# Patient Record
Sex: FEMALE | Race: BLACK OR AFRICAN AMERICAN | Employment: OTHER | URBAN - METROPOLITAN AREA
[De-identification: names, ages, dates, MRNs, and addresses within clinical notes are randomized per-mention and may not be internally consistent; named-entity substitution may affect disease eponyms.]

---

## 2017-01-20 ENCOUNTER — OP HISTORICAL/CONVERTED ENCOUNTER (OUTPATIENT)
Dept: OTHER | Age: 77
End: 2017-01-20

## 2017-07-13 ENCOUNTER — OP HISTORICAL/CONVERTED ENCOUNTER (OUTPATIENT)
Dept: OTHER | Age: 77
End: 2017-07-13

## 2018-05-27 ENCOUNTER — ED HISTORICAL/CONVERTED ENCOUNTER (OUTPATIENT)
Dept: OTHER | Age: 78
End: 2018-05-27

## 2018-08-16 ENCOUNTER — OP HISTORICAL/CONVERTED ENCOUNTER (OUTPATIENT)
Dept: OTHER | Age: 78
End: 2018-08-16

## 2019-04-04 ENCOUNTER — ED HISTORICAL/CONVERTED ENCOUNTER (OUTPATIENT)
Dept: OTHER | Age: 79
End: 2019-04-04

## 2019-09-17 ENCOUNTER — OP HISTORICAL/CONVERTED ENCOUNTER (OUTPATIENT)
Dept: OTHER | Age: 79
End: 2019-09-17

## 2021-10-28 ENCOUNTER — TRANSCRIBE ORDER (OUTPATIENT)
Dept: SCHEDULING | Age: 81
End: 2021-10-28

## 2021-10-28 DIAGNOSIS — Z12.31 ENCOUNTER FOR MAMMOGRAM TO ESTABLISH BASELINE MAMMOGRAM: Primary | ICD-10-CM

## 2021-12-09 ENCOUNTER — TRANSCRIBE ORDER (OUTPATIENT)
Dept: SCHEDULING | Age: 81
End: 2021-12-09

## 2021-12-09 DIAGNOSIS — Z12.31 SCREENING MAMMOGRAM FOR HIGH-RISK PATIENT: Primary | ICD-10-CM

## 2021-12-17 ENCOUNTER — HOSPITAL ENCOUNTER (OUTPATIENT)
Dept: MAMMOGRAPHY | Age: 81
Discharge: HOME | End: 2021-12-17
Payer: MEDICARE

## 2021-12-17 DIAGNOSIS — Z12.31 SCREENING MAMMOGRAM FOR HIGH-RISK PATIENT: ICD-10-CM

## 2021-12-17 PROCEDURE — 77063 BREAST TOMOSYNTHESIS BI: CPT

## 2022-12-29 ENCOUNTER — TRANSCRIBE ORDER (OUTPATIENT)
Dept: SCHEDULING | Age: 82
End: 2022-12-29

## 2022-12-29 DIAGNOSIS — Z12.31 SCREENING MAMMOGRAM FOR HIGH-RISK PATIENT: Primary | ICD-10-CM

## 2022-12-30 ENCOUNTER — HOSPITAL ENCOUNTER (OUTPATIENT)
Dept: MAMMOGRAPHY | Age: 82
Discharge: HOME | End: 2022-12-30
Payer: MEDICARE

## 2022-12-30 DIAGNOSIS — Z12.31 SCREENING MAMMOGRAM FOR HIGH-RISK PATIENT: ICD-10-CM

## 2022-12-30 PROCEDURE — 77063 BREAST TOMOSYNTHESIS BI: CPT

## 2023-04-07 ENCOUNTER — HOSPITAL ENCOUNTER (OUTPATIENT)
Age: 83
End: 2023-04-07
Attending: STUDENT IN AN ORGANIZED HEALTH CARE EDUCATION/TRAINING PROGRAM
Payer: MEDICARE

## 2023-07-03 NOTE — PRE-PROCEDURE INSTRUCTIONS
Called patient at [phone]. No answer. Voicemail left with detailed pre-procedure instructions. Made aware of Importance of having a responsible party bring and take her home. Arrival time of 0845 given. Endoscopy number provided.

## 2023-07-07 ENCOUNTER — ANESTHESIA (OUTPATIENT)
Facility: HOSPITAL | Age: 83
End: 2023-07-07
Payer: MEDICARE

## 2023-07-07 ENCOUNTER — HOSPITAL ENCOUNTER (OUTPATIENT)
Facility: HOSPITAL | Age: 83
Setting detail: OUTPATIENT SURGERY
Discharge: HOME | End: 2023-07-07
Attending: INTERNAL MEDICINE | Admitting: INTERNAL MEDICINE
Payer: MEDICARE

## 2023-07-07 ENCOUNTER — ANESTHESIA EVENT (OUTPATIENT)
Facility: HOSPITAL | Age: 83
End: 2023-07-07
Payer: MEDICARE

## 2023-07-07 VITALS
DIASTOLIC BLOOD PRESSURE: 64 MMHG | TEMPERATURE: 97.9 F | HEIGHT: 56 IN | WEIGHT: 118 LBS | SYSTOLIC BLOOD PRESSURE: 132 MMHG | OXYGEN SATURATION: 98 % | HEART RATE: 58 BPM | BODY MASS INDEX: 26.54 KG/M2 | RESPIRATION RATE: 16 BRPM

## 2023-07-07 LAB
GLUCOSE BLD STRIP.AUTO-MCNC: 208 MG/DL (ref 65–100)
PERFORMED BY:: ABNORMAL

## 2023-07-07 PROCEDURE — 3700000000 HC ANESTHESIA ATTENDED CARE: Performed by: INTERNAL MEDICINE

## 2023-07-07 PROCEDURE — 7100000010 HC PHASE II RECOVERY - FIRST 15 MIN: Performed by: INTERNAL MEDICINE

## 2023-07-07 PROCEDURE — 2580000003 HC RX 258: Performed by: NURSE ANESTHETIST, CERTIFIED REGISTERED

## 2023-07-07 PROCEDURE — 3700000001 HC ADD 15 MINUTES (ANESTHESIA): Performed by: INTERNAL MEDICINE

## 2023-07-07 PROCEDURE — 6360000002 HC RX W HCPCS: Performed by: NURSE ANESTHETIST, CERTIFIED REGISTERED

## 2023-07-07 PROCEDURE — 3600007512: Performed by: INTERNAL MEDICINE

## 2023-07-07 PROCEDURE — 2709999900 HC NON-CHARGEABLE SUPPLY: Performed by: INTERNAL MEDICINE

## 2023-07-07 PROCEDURE — 7100000011 HC PHASE II RECOVERY - ADDTL 15 MIN: Performed by: INTERNAL MEDICINE

## 2023-07-07 PROCEDURE — 82962 GLUCOSE BLOOD TEST: CPT

## 2023-07-07 PROCEDURE — 3600007502: Performed by: INTERNAL MEDICINE

## 2023-07-07 PROCEDURE — 2500000003 HC RX 250 WO HCPCS: Performed by: NURSE ANESTHETIST, CERTIFIED REGISTERED

## 2023-07-07 RX ORDER — SODIUM CHLORIDE, SODIUM LACTATE, POTASSIUM CHLORIDE, CALCIUM CHLORIDE 600; 310; 30; 20 MG/100ML; MG/100ML; MG/100ML; MG/100ML
INJECTION, SOLUTION INTRAVENOUS CONTINUOUS PRN
Status: DISCONTINUED | OUTPATIENT
Start: 2023-07-07 | End: 2023-07-07 | Stop reason: SDUPTHER

## 2023-07-07 RX ORDER — HYDROCHLOROTHIAZIDE 12.5 MG/1
TABLET ORAL
COMMUNITY
Start: 2023-05-08

## 2023-07-07 RX ORDER — LIDOCAINE HYDROCHLORIDE 20 MG/ML
INJECTION, SOLUTION EPIDURAL; INFILTRATION; INTRACAUDAL; PERINEURAL PRN
Status: DISCONTINUED | OUTPATIENT
Start: 2023-07-07 | End: 2023-07-07 | Stop reason: SDUPTHER

## 2023-07-07 RX ORDER — POTASSIUM CHLORIDE 750 MG/1
1 TABLET, EXTENDED RELEASE ORAL DAILY
COMMUNITY
Start: 2022-08-10

## 2023-07-07 RX ORDER — SODIUM CHLORIDE, SODIUM LACTATE, POTASSIUM CHLORIDE, CALCIUM CHLORIDE 600; 310; 30; 20 MG/100ML; MG/100ML; MG/100ML; MG/100ML
INJECTION, SOLUTION INTRAVENOUS CONTINUOUS
Status: DISCONTINUED | OUTPATIENT
Start: 2023-07-07 | End: 2023-07-07 | Stop reason: HOSPADM

## 2023-07-07 RX ORDER — ESCITALOPRAM OXALATE 5 MG/1
TABLET ORAL
COMMUNITY
Start: 2023-05-08

## 2023-07-07 RX ORDER — VERAPAMIL HYDROCHLORIDE 120 MG/1
TABLET, FILM COATED ORAL
COMMUNITY
Start: 2023-05-08

## 2023-07-07 RX ORDER — SODIUM CHLORIDE 9 MG/ML
INJECTION, SOLUTION INTRAVENOUS CONTINUOUS
Status: DISCONTINUED | OUTPATIENT
Start: 2023-07-07 | End: 2023-07-07 | Stop reason: HOSPADM

## 2023-07-07 RX ORDER — ALENDRONATE SODIUM 70 MG/1
TABLET ORAL
COMMUNITY
Start: 2023-05-08

## 2023-07-07 RX ORDER — PRAVASTATIN SODIUM 20 MG
TABLET ORAL
COMMUNITY
Start: 2023-05-08

## 2023-07-07 RX ORDER — PREDNISONE 10 MG/1
TABLET ORAL
COMMUNITY
Start: 2023-06-15

## 2023-07-07 RX ORDER — CLONAZEPAM 0.5 MG/1
TABLET ORAL
COMMUNITY
Start: 2023-05-08

## 2023-07-07 RX ORDER — OMEPRAZOLE 40 MG/1
CAPSULE, DELAYED RELEASE ORAL
COMMUNITY
Start: 2023-05-08

## 2023-07-07 RX ORDER — FOLIC ACID 1 MG/1
1000 TABLET ORAL DAILY
COMMUNITY
Start: 2023-06-15

## 2023-07-07 RX ORDER — PROPOFOL 10 MG/ML
INJECTION, EMULSION INTRAVENOUS PRN
Status: DISCONTINUED | OUTPATIENT
Start: 2023-07-07 | End: 2023-07-07 | Stop reason: SDUPTHER

## 2023-07-07 RX ADMIN — SODIUM CHLORIDE, POTASSIUM CHLORIDE, SODIUM LACTATE AND CALCIUM CHLORIDE: 600; 310; 30; 20 INJECTION, SOLUTION INTRAVENOUS at 11:12

## 2023-07-07 RX ADMIN — PROPOFOL 30 MG: 10 INJECTION, EMULSION INTRAVENOUS at 11:20

## 2023-07-07 RX ADMIN — PROPOFOL 20 MG: 10 INJECTION, EMULSION INTRAVENOUS at 11:25

## 2023-07-07 RX ADMIN — LIDOCAINE HYDROCHLORIDE 80 MG: 20 INJECTION, SOLUTION EPIDURAL; INFILTRATION; INTRACAUDAL; PERINEURAL at 11:14

## 2023-07-07 RX ADMIN — PROPOFOL 80 MG: 10 INJECTION, EMULSION INTRAVENOUS at 11:14

## 2023-07-07 ASSESSMENT — PAIN SCALES - GENERAL
PAINLEVEL_OUTOF10: 0
PAINLEVEL_OUTOF10: 0

## 2023-07-07 ASSESSMENT — PAIN - FUNCTIONAL ASSESSMENT: PAIN_FUNCTIONAL_ASSESSMENT: NONE - DENIES PAIN

## 2023-07-07 NOTE — ANESTHESIA POSTPROCEDURE EVALUATION
Department of Anesthesiology  Postprocedure Note    Patient: Linette Stanton  MRN: [id number]  YOB: 1940  Date of evaluation: 7/7/2023      Procedure Summary     Date: 07/07/23 Room / Location: [facility] 01 / [facility] ENDOSCOPY    Anesthesia Start: 1112 Anesthesia Stop: 6899    Procedure: COLONOSCOPY Diagnosis:       Change in bowel habits      Hx of colonic polyps      (Change in bowel habits [R19.4])      (Hx of colonic polyps [Z86.010])    Surgeons: Billie Yang MD Responsible Provider: Rebecca Dorado MD    Anesthesia Type: MAC, TIVA ASA Status: 3          Anesthesia Type: No value filed. Claudia Phase I: Claudia Score: 10    Claudia Phase II:        Anesthesia Post Evaluation    Patient location during evaluation: bedside (Endoscopy Unit)  Patient participation: complete - patient participated  Level of consciousness: sleepy but conscious  Pain score: 0  Airway patency: patent  Nausea & Vomiting: no nausea and no vomiting  Complications: no  Cardiovascular status: hemodynamically stable  Respiratory status: acceptable  Hydration status: stable  Comments: This patient remained on the stretcher. The patient was handed off to the endoscopy nursing team.  All questions regarding pre-, intra-, and postoperative care were answered.   Multimodal analgesia pain management approach PAST SURGICAL HISTORY:  ACL (anterior cruciate ligament) tear

## 2023-07-07 NOTE — DISCHARGE INSTRUCTIONS
Return to normal activities tomorrow. -  High fiber diet. -  Continue present medications. -  Return to GI clinic in 3 months.

## 2023-07-07 NOTE — PERIOP NOTE
Patient alert and oriented x 4 with respirations even and unlabored on RA. Patient discharged home per physician's order. Patient and patient's daughter verbalize understanding of patient's discharge instructions. Patient declines wheelchair transport for discharge. Patient ambulating with steady gait noted accompanied by nurse to front hospital entrance with patient's daughter present to transport patient via private vehicle.

## 2023-11-24 ENCOUNTER — HOSPITAL ENCOUNTER (EMERGENCY)
Facility: HOSPITAL | Age: 83
Discharge: HOME | End: 2023-11-24
Attending: EMERGENCY MEDICINE
Payer: MEDICARE

## 2023-11-24 VITALS
RESPIRATION RATE: 16 BRPM | OXYGEN SATURATION: 99 % | TEMPERATURE: 98.8 F | HEIGHT: 56 IN | DIASTOLIC BLOOD PRESSURE: 63 MMHG | HEART RATE: 75 BPM | SYSTOLIC BLOOD PRESSURE: 137 MMHG | WEIGHT: 117 LBS | BODY MASS INDEX: 26.32 KG/M2

## 2023-11-24 DIAGNOSIS — K04.7 DENTAL ABSCESS: Primary | ICD-10-CM

## 2023-11-24 PROCEDURE — 6370000000 HC RX 637 (ALT 250 FOR IP): Performed by: EMERGENCY MEDICINE

## 2023-11-24 PROCEDURE — 99283 EMERGENCY DEPT VISIT LOW MDM: CPT

## 2023-11-24 RX ORDER — CLINDAMYCIN HYDROCHLORIDE 300 MG/1
300 CAPSULE ORAL 4 TIMES DAILY
Qty: 28 CAPSULE | Refills: 0 | Status: SHIPPED | OUTPATIENT
Start: 2023-11-24 | End: 2023-12-01

## 2023-11-24 RX ORDER — DIPHENHYDRAMINE HCL 12.5MG/5ML
12.5 LIQUID (ML) ORAL
Status: COMPLETED | OUTPATIENT
Start: 2023-11-24 | End: 2023-11-24

## 2023-11-24 RX ORDER — LIDOCAINE HYDROCHLORIDE 20 MG/ML
15 SOLUTION OROPHARYNGEAL
Status: COMPLETED | OUTPATIENT
Start: 2023-11-24 | End: 2023-11-24

## 2023-11-24 RX ADMIN — Medication 15 ML: at 10:13

## 2023-11-24 RX ADMIN — BENZOCAINE, BUTAMBEN, AND TETRACAINE HYDROCHLORIDE 1 SPRAY: .028; .004; .004 AEROSOL, SPRAY TOPICAL at 10:13

## 2023-11-24 RX ADMIN — DIPHENHYDRAMINE HYDROCHLORIDE 12.5 MG: 25 SOLUTION ORAL at 10:13

## 2023-11-24 ASSESSMENT — PAIN - FUNCTIONAL ASSESSMENT: PAIN_FUNCTIONAL_ASSESSMENT: 0-10

## 2023-11-24 ASSESSMENT — PAIN DESCRIPTION - LOCATION: LOCATION: MOUTH

## 2023-11-24 ASSESSMENT — PAIN SCALES - GENERAL: PAINLEVEL_OUTOF10: 8

## 2023-11-24 ASSESSMENT — PAIN DESCRIPTION - ORIENTATION: ORIENTATION: LOWER;RIGHT

## 2023-11-24 NOTE — ED PROVIDER NOTES
[facility] EMERGENCY DEPT  EMERGENCY DEPARTMENT HISTORY AND PHYSICAL EXAM      Date: 11/24/2023  Patient Name: Rhea Blankenship  MRN: [id number]  [address] [facility] 1940  Date of evaluation: 11/24/2023  Provider: Juan Diego Fernandez MD   Note Started: 10:13 AM EST 11/24/23    HISTORY OF PRESENT ILLNESS     Chief Complaint   Patient presents with    Abscess       History Provided By: Patient    HPI: Rhea Blankenship is a 82 y.o. female presents to the emergency department complaint of right lower tooth infection x 3 weeks. Patient reports having seen primary care and was put on amoxicillin that helps with the pain but is concerned the abscess is still there. Patient states she has a follow-up on Tuesday with her dentist to have the teeth removed. Patient denies fevers or chills, denies difficulty eating or drinking. PAST MEDICAL HISTORY   Past Medical History:  Past Medical History:   Diagnosis Date    Arthritis     Depression     Diabetes mellitus ([address])     Hyperlipidemia     Hypertension     Migraine        Past Surgical History:  Past Surgical History:   Procedure Laterality Date    COLONOSCOPY      polyps    COLONOSCOPY N/A 7/7/2023    COLONOSCOPY performed by Romi Cardenas MD at [facility] ENDOSCOPY       Family History:  No family history on file.     Social History:  Social History     Tobacco Use    Smoking status: Never   Substance Use Topics    Alcohol use: Never    Drug use: Never       Allergies:  No Known Allergies    PCP: Leonarda Burrell MD    Current Meds:   Current Facility-Administered Medications   Medication Dose Route Frequency Provider Last Rate Last Admin    diphenhydrAMINE (BENADRYL) 12.5 MG/5ML elixir 12.5 mg  12.5 mg Oral NOW Juan Diego Fernandez MD        lidocaine viscous hcl (XYLOCAINE) 2 % solution 15 mL  15 mL Mouth/Throat NOW Juan Diego Fernandez MD        butamben-tetracaine-benzocaine (CETACAINE) spray 1 spray  1 spray Topical NOW Juan Diego Fernandez MD         Current Outpatient

## 2023-11-24 NOTE — ED TRIAGE NOTES
Patient reports abscess on gum on right lower side x3 weeks. Was seen by PCP & was given amoxicillin that helped with the pain but the abscess is still there.

## 2024-01-04 ENCOUNTER — HOSPITAL ENCOUNTER (OUTPATIENT)
Facility: HOSPITAL | Age: 84
Discharge: HOME | End: 2024-01-04
Payer: MEDICARE

## 2024-01-04 VITALS — WEIGHT: 117 LBS | BODY MASS INDEX: 26.32 KG/M2 | HEIGHT: 56 IN

## 2024-01-04 DIAGNOSIS — Z12.31 ENCOUNTER FOR SCREENING MAMMOGRAM FOR MALIGNANT NEOPLASM OF BREAST: ICD-10-CM

## 2024-01-04 PROCEDURE — 77063 BREAST TOMOSYNTHESIS BI: CPT

## 2024-04-24 ENCOUNTER — HOSPITAL ENCOUNTER (EMERGENCY)
Facility: HOSPITAL | Age: 84
Discharge: HOME | End: 2024-04-24
Attending: STUDENT IN AN ORGANIZED HEALTH CARE EDUCATION/TRAINING PROGRAM
Payer: MEDICARE

## 2024-04-24 VITALS
HEIGHT: 55 IN | SYSTOLIC BLOOD PRESSURE: 152 MMHG | HEART RATE: 79 BPM | WEIGHT: 109 LBS | OXYGEN SATURATION: 99 % | DIASTOLIC BLOOD PRESSURE: 73 MMHG | TEMPERATURE: 98.3 F | BODY MASS INDEX: 25.22 KG/M2 | RESPIRATION RATE: 16 BRPM

## 2024-04-24 DIAGNOSIS — N39.0 URINARY TRACT INFECTION IN FEMALE: Primary | ICD-10-CM

## 2024-04-24 LAB
ALBUMIN SERPL-MCNC: 4.2 G/DL (ref 3.5–5)
ALBUMIN/GLOB SERPL: 1.9 (ref 1.1–2.2)
ALP SERPL-CCNC: 36 U/L (ref 45–117)
ALT SERPL-CCNC: 20 U/L (ref 12–78)
ANION GAP SERPL CALC-SCNC: 11 MMOL/L (ref 5–15)
APPEARANCE UR: ABNORMAL
AST SERPL W P-5'-P-CCNC: 9 U/L (ref 15–37)
BACTERIA URNS QL MICRO: ABNORMAL /HPF
BASOPHILS # BLD: 0 K/UL (ref 0–0.1)
BASOPHILS NFR BLD: 0 % (ref 0–1)
BILIRUB SERPL-MCNC: 4.2 MG/DL (ref 0.2–1)
BILIRUB UR QL CFM: NEGATIVE
BILIRUB UR QL: ABNORMAL
BUN SERPL-MCNC: 13 MG/DL (ref 6–20)
BUN/CREAT SERPL: 17 (ref 12–20)
CA-I BLD-MCNC: 9.3 MG/DL (ref 8.5–10.1)
CHLORIDE SERPL-SCNC: 97 MMOL/L (ref 97–108)
CO2 SERPL-SCNC: 29 MMOL/L (ref 21–32)
COLOR UR: ABNORMAL
CREAT SERPL-MCNC: 0.76 MG/DL (ref 0.55–1.02)
DIFFERENTIAL METHOD BLD: ABNORMAL
EOSINOPHIL # BLD: 0.4 K/UL (ref 0–0.4)
EOSINOPHIL NFR BLD: 3 % (ref 0–7)
EPITH CASTS URNS QL MICRO: ABNORMAL /LPF
ERYTHROCYTE [DISTWIDTH] IN BLOOD BY AUTOMATED COUNT: 31.1 % (ref 11.5–14.5)
GLOBULIN SER CALC-MCNC: 2.2 G/DL (ref 2–4)
GLUCOSE SERPL-MCNC: 172 MG/DL (ref 65–100)
GLUCOSE UR STRIP.AUTO-MCNC: NEGATIVE MG/DL
HCT VFR BLD AUTO: 24.6 % (ref 35–47)
HGB BLD-MCNC: 9 G/DL (ref 11.5–16)
HGB UR QL STRIP: ABNORMAL
IMM GRANULOCYTES # BLD AUTO: 0 K/UL
IMM GRANULOCYTES NFR BLD AUTO: 0 %
KETONES UR QL STRIP.AUTO: NEGATIVE MG/DL
LEUKOCYTE ESTERASE UR QL STRIP.AUTO: ABNORMAL
LYMPHOCYTES # BLD: 4.1 K/UL (ref 0.8–3.5)
LYMPHOCYTES NFR BLD: 35 % (ref 12–49)
MCH RBC QN AUTO: 36.3 PG (ref 26–34)
MCHC RBC AUTO-ENTMCNC: 36.6 G/DL (ref 30–36.5)
MCV RBC AUTO: 99.2 FL (ref 80–99)
METAMYELOCYTES NFR BLD MANUAL: 2 %
MONOCYTES # BLD: 0.5 K/UL (ref 0–1)
MONOCYTES NFR BLD: 4 % (ref 5–13)
MYELOCYTES NFR BLD MANUAL: 1 %
NEUTS BAND NFR BLD MANUAL: 9 % (ref 0–6)
NEUTS SEG # BLD: 6.4 K/UL (ref 1.8–8)
NEUTS SEG NFR BLD: 46 % (ref 32–75)
NITRITE UR QL STRIP.AUTO: POSITIVE
NRBC # BLD: 0.47 K/UL
NRBC BLD MANUAL-RTO: 4 PER 100 WBC
PH UR STRIP: 5 (ref 5–8)
PLATELET # BLD AUTO: 359 K/UL (ref 150–400)
PMV BLD AUTO: 11.7 FL (ref 8.9–12.9)
POTASSIUM SERPL-SCNC: 3.7 MMOL/L (ref 3.5–5.1)
PROT SERPL-MCNC: 6.4 G/DL (ref 6.4–8.2)
PROT UR STRIP-MCNC: >300 MG/DL
RBC # BLD AUTO: 2.48 M/UL (ref 3.8–5.2)
RBC #/AREA URNS HPF: >100 /HPF (ref 0–5)
RBC MORPH BLD: ABNORMAL
SODIUM SERPL-SCNC: 137 MMOL/L (ref 136–145)
SP GR UR REFRACTOMETRY: 1.01 (ref 1–1.03)
URINE CULTURE IF INDICATED: ABNORMAL
UROBILINOGEN UR QL STRIP.AUTO: 8 EU/DL (ref 0.2–1)
WBC # BLD AUTO: 11.6 K/UL (ref 3.6–11)
WBC URNS QL MICRO: ABNORMAL /HPF (ref 0–4)

## 2024-04-24 PROCEDURE — 96374 THER/PROPH/DIAG INJ IV PUSH: CPT

## 2024-04-24 PROCEDURE — 80053 COMPREHEN METABOLIC PANEL: CPT

## 2024-04-24 PROCEDURE — 2580000003 HC RX 258: Performed by: STUDENT IN AN ORGANIZED HEALTH CARE EDUCATION/TRAINING PROGRAM

## 2024-04-24 PROCEDURE — 6360000002 HC RX W HCPCS: Performed by: STUDENT IN AN ORGANIZED HEALTH CARE EDUCATION/TRAINING PROGRAM

## 2024-04-24 PROCEDURE — 81001 URINALYSIS AUTO W/SCOPE: CPT

## 2024-04-24 PROCEDURE — 87088 URINE BACTERIA CULTURE: CPT

## 2024-04-24 PROCEDURE — 99284 EMERGENCY DEPT VISIT MOD MDM: CPT

## 2024-04-24 PROCEDURE — 85025 COMPLETE CBC W/AUTO DIFF WBC: CPT

## 2024-04-24 PROCEDURE — 87086 URINE CULTURE/COLONY COUNT: CPT

## 2024-04-24 PROCEDURE — 87186 SC STD MICRODIL/AGAR DIL: CPT

## 2024-04-24 PROCEDURE — 87147 CULTURE TYPE IMMUNOLOGIC: CPT

## 2024-04-24 PROCEDURE — 6370000000 HC RX 637 (ALT 250 FOR IP): Performed by: STUDENT IN AN ORGANIZED HEALTH CARE EDUCATION/TRAINING PROGRAM

## 2024-04-24 RX ORDER — ACETAMINOPHEN 500 MG
1000 TABLET ORAL
Status: COMPLETED | OUTPATIENT
Start: 2024-04-24 | End: 2024-04-24

## 2024-04-24 RX ORDER — 0.9 % SODIUM CHLORIDE 0.9 %
1000 INTRAVENOUS SOLUTION INTRAVENOUS ONCE
Status: COMPLETED | OUTPATIENT
Start: 2024-04-24 | End: 2024-04-24

## 2024-04-24 RX ORDER — CEPHALEXIN 500 MG/1
500 CAPSULE ORAL 2 TIMES DAILY
Qty: 14 CAPSULE | Refills: 0 | Status: SHIPPED | OUTPATIENT
Start: 2024-04-24 | End: 2024-05-01

## 2024-04-24 RX ADMIN — CEFTRIAXONE SODIUM 1000 MG: 1 INJECTION, POWDER, FOR SOLUTION INTRAMUSCULAR; INTRAVENOUS at 02:46

## 2024-04-24 RX ADMIN — ACETAMINOPHEN 1000 MG: 500 TABLET ORAL at 02:46

## 2024-04-24 RX ADMIN — SODIUM CHLORIDE 1000 ML: 9 INJECTION, SOLUTION INTRAVENOUS at 02:47

## 2024-04-24 ASSESSMENT — PAIN - FUNCTIONAL ASSESSMENT: PAIN_FUNCTIONAL_ASSESSMENT: NONE - DENIES PAIN

## 2024-04-24 ASSESSMENT — LIFESTYLE VARIABLES
HOW MANY STANDARD DRINKS CONTAINING ALCOHOL DO YOU HAVE ON A TYPICAL DAY: PATIENT DOES NOT DRINK
HOW OFTEN DO YOU HAVE A DRINK CONTAINING ALCOHOL: NEVER

## 2024-04-24 NOTE — DISCHARGE INSTRUCTIONS
Thank you!  Thank you for allowing me to care for you in the emergency department. It is my goal to provide you with excellent care.  Please fill out the survey that will come to you by mail or email since we listen to your feedback!     Below you will find a list of your tests from today's visit.  Should you have any questions, please do not hesitate to call the emergency department.    Labs  Recent Results (from the past 12 hour(s))   Urinalysis with Reflex to Culture    Collection Time: 04/24/24  1:59 AM    Specimen: Urine   Result Value Ref Range    Color, UA Orange      Appearance Hazy (A) Clear      Specific Gravity, UA 1.010 1.003 - 1.030      pH, Urine 5.0 5.0 - 8.0      Protein, UA >300 (A) Negative mg/dL    Glucose, UA Negative Negative mg/dL    Ketones, Urine Negative Negative mg/dL    Bilirubin Urine Moderate (A) Negative      Blood, Urine Large (A) Negative      Urobilinogen, Urine 8.0 (H) 0.2 - 1.0 EU/dL    Nitrite, Urine Positive (A) Negative      Leukocyte Esterase, Urine Large (A) Negative      Bilirubin Confirmation, UA Negative Negative      WBC, UA  0 - 4 /hpf    RBC, UA >100 (H) 0 - 5 /hpf    Epithelial Cells UA Few Few /lpf    BACTERIA, URINE 2+ (A) Negative /hpf    Urine Culture if Indicated Urine Culture Ordered (A) Culture not indicated by UA result     Comprehensive Metabolic Panel    Collection Time: 04/24/24  2:30 AM   Result Value Ref Range    Sodium 137 136 - 145 mmol/L    Potassium 3.7 3.5 - 5.1 mmol/L    Chloride 97 97 - 108 mmol/L    CO2 29 21 - 32 mmol/L    Anion Gap 11 5 - 15 mmol/L    Glucose 172 (H) 65 - 100 mg/dL    BUN 13 6 - 20 mg/dL    Creatinine 0.76 0.55 - 1.02 mg/dL    Bun/Cre Ratio 17 12 - 20      Est, Glom Filt Rate 79 >60 ml/min/1.73m2    Calcium 9.3 8.5 - 10.1 mg/dL    Total Bilirubin 4.2 (H) 0.2 - 1.0 mg/dL    AST 9 (L) 15 - 37 U/L    ALT 20 12 - 78 U/L    Alk Phosphatase 36 (L) 45 - 117 U/L    Total Protein 6.4 6.4 - 8.2 g/dL    Albumin 4.2 3.5 - 5.0 g/dL

## 2024-04-24 NOTE — ED PROVIDER NOTES
Plain radiographic images are visualized and preliminarily interpreted by the ED Provider with the following findings: Not Applicable.    Interpretation per the Radiologist below, if available at the time of this note:  No orders to display        ED COURSE and DIFFERENTIAL DIAGNOSIS/MDM   2:34 AM Differential and Considerations:     83-year-old female with history of multiple comorbidities who presents to the emergency department due to hematuria, urinary frequency and urgency x 1 day.  Patient well-appearing, vital signs stable, benign abdominal exam.  Will obtain basic labs, and UA to assess for cystitis versus pyelonephritis.  Will provide IV fluid.  Dispo pending results.    Records Reviewed (source and summary of external notes): Prior medical records and Nursing notes.    Vitals:    Vitals:    04/24/24 0155   BP: (!) 152/73   Pulse: 79   Resp: 16   Temp: 98.3 °F (36.8 °C)   TempSrc: Oral   SpO2: 99%   Weight: 49.4 kg (109 lb)   Height: 1.346 m (4' 5\")        ED COURSE  ED Course as of 04/24/24 0234   Wed Apr 24, 2024   0234 UA consistent with UTI.  Will provide IV ceftriaxone here.  Advised to follow-up with primary care and urology, explained strict return precautions. [RD]      ED Course User Index  [RD] Dorian Hunt, DO       SEPSIS Reassessment: Sepsis reassessment not applicable    Clinical Management Tools:  Not Applicable    Patient was given the following medications:  Medications   cefTRIAXone (ROCEPHIN) 1,000 mg in sterile water 10 mL IV syringe (has no administration in time range)   sodium chloride 0.9 % bolus 1,000 mL (has no administration in time range)       CONSULTS: See ED Course/MDM for further details.  None     Social Determinants affecting Diagnosis/Treatment: None    Smoking Cessation: Not Applicable    PROCEDURES   Unless otherwise noted above, none  Procedures      CRITICAL CARE TIME   Patient does not meet Critical Care Time, 0 minutes    ED IMPRESSION   No diagnosis found.

## 2024-04-24 NOTE — ED TRIAGE NOTES
States \"I think I have a bladder infection.\" States blood in urine and urinary frequency. Denies flank pain or fever.

## 2024-04-26 LAB
BACTERIA SPEC CULT: ABNORMAL
BACTERIA SPEC CULT: ABNORMAL
COLONY COUNT, CNT: ABNORMAL
Lab: ABNORMAL

## 2024-06-27 ENCOUNTER — HOSPITAL ENCOUNTER (OUTPATIENT)
Facility: HOSPITAL | Age: 84
Discharge: HOME | End: 2024-06-27
Payer: MEDICARE

## 2024-06-27 DIAGNOSIS — R63.4 ABNORMAL LOSS OF WEIGHT: ICD-10-CM

## 2024-06-27 DIAGNOSIS — D64.9 ANEMIA, UNSPECIFIED TYPE: ICD-10-CM

## 2024-06-27 DIAGNOSIS — R10.9 ABDOMINAL PAIN, UNSPECIFIED ABDOMINAL LOCATION: ICD-10-CM

## 2024-06-27 DIAGNOSIS — R89.9 ABNORMAL LABORATORY TEST: ICD-10-CM

## 2024-06-27 LAB — CREAT BLD-MCNC: 0.6 MG/DL (ref 0.6–1.3)

## 2024-06-27 PROCEDURE — 74177 CT ABD & PELVIS W/CONTRAST: CPT

## 2024-06-27 PROCEDURE — 6360000004 HC RX CONTRAST MEDICATION: Performed by: FAMILY MEDICINE

## 2024-06-27 PROCEDURE — 82565 ASSAY OF CREATININE: CPT

## 2024-06-27 RX ADMIN — IOPAMIDOL 100 ML: 755 INJECTION, SOLUTION INTRAVENOUS at 15:32

## 2024-10-29 ENCOUNTER — HOSPITAL ENCOUNTER (EMERGENCY)
Facility: HOSPITAL | Age: 84
Discharge: HOME | End: 2024-10-29
Attending: EMERGENCY MEDICINE
Payer: MEDICARE

## 2024-10-29 VITALS
BODY MASS INDEX: 22.04 KG/M2 | RESPIRATION RATE: 16 BRPM | WEIGHT: 105 LBS | OXYGEN SATURATION: 99 % | SYSTOLIC BLOOD PRESSURE: 144 MMHG | HEART RATE: 77 BPM | TEMPERATURE: 98 F | DIASTOLIC BLOOD PRESSURE: 70 MMHG | HEIGHT: 58 IN

## 2024-10-29 DIAGNOSIS — S16.1XXA STRAIN OF NECK MUSCLE, INITIAL ENCOUNTER: Primary | ICD-10-CM

## 2024-10-29 DIAGNOSIS — S46.811A STRAIN OF RIGHT TRAPEZIUS MUSCLE, INITIAL ENCOUNTER: ICD-10-CM

## 2024-10-29 PROCEDURE — 99284 EMERGENCY DEPT VISIT MOD MDM: CPT

## 2024-10-29 PROCEDURE — 6370000000 HC RX 637 (ALT 250 FOR IP): Performed by: EMERGENCY MEDICINE

## 2024-10-29 PROCEDURE — 6360000002 HC RX W HCPCS: Performed by: EMERGENCY MEDICINE

## 2024-10-29 PROCEDURE — 96372 THER/PROPH/DIAG INJ SC/IM: CPT

## 2024-10-29 RX ORDER — LIDOCAINE 4 G/G
1 PATCH TOPICAL
Status: DISCONTINUED | OUTPATIENT
Start: 2024-10-29 | End: 2024-10-29

## 2024-10-29 RX ORDER — METHOCARBAMOL 500 MG/1
1000 TABLET, FILM COATED ORAL ONCE
Status: COMPLETED | OUTPATIENT
Start: 2024-10-29 | End: 2024-10-29

## 2024-10-29 RX ORDER — METHOCARBAMOL 500 MG/1
1000 TABLET, FILM COATED ORAL 4 TIMES DAILY
Qty: 20 TABLET | Refills: 0 | Status: SHIPPED | OUTPATIENT
Start: 2024-10-29 | End: 2024-11-01

## 2024-10-29 RX ORDER — NAPROXEN 500 MG/1
500 TABLET ORAL 2 TIMES DAILY WITH MEALS
Qty: 20 TABLET | Refills: 0 | Status: SHIPPED | OUTPATIENT
Start: 2024-10-29

## 2024-10-29 RX ORDER — KETOROLAC TROMETHAMINE 30 MG/ML
30 INJECTION, SOLUTION INTRAMUSCULAR; INTRAVENOUS
Status: COMPLETED | OUTPATIENT
Start: 2024-10-29 | End: 2024-10-29

## 2024-10-29 RX ADMIN — KETOROLAC TROMETHAMINE 30 MG: 30 INJECTION, SOLUTION INTRAMUSCULAR; INTRAVENOUS at 10:50

## 2024-10-29 RX ADMIN — METHOCARBAMOL 1000 MG: 500 TABLET ORAL at 10:50

## 2024-10-29 ASSESSMENT — PAIN - FUNCTIONAL ASSESSMENT: PAIN_FUNCTIONAL_ASSESSMENT: 0-10

## 2024-10-29 ASSESSMENT — PAIN SCALES - GENERAL
PAINLEVEL_OUTOF10: 8
PAINLEVEL_OUTOF10: 10

## 2024-10-29 ASSESSMENT — PAIN DESCRIPTION - LOCATION: LOCATION: SHOULDER;NECK

## 2024-10-29 ASSESSMENT — PAIN DESCRIPTION - ORIENTATION: ORIENTATION: RIGHT

## 2024-10-29 NOTE — ED TRIAGE NOTES
Pt c/o right sided neck/shoulder pain; PCP prescribed Ibuprofen and Flexeril yesterday but pt states those meds are not helping; denies injury/trauma     Daughter at bedside

## 2024-10-29 NOTE — ED PROVIDER NOTES
[facility] EMERGENCY DEPT  EMERGENCY DEPARTMENT HISTORY AND PHYSICAL EXAM      Date: 10/29/2024  Patient Name: Leona Flores  MRN: [id number]  YOB: 1940  Date of evaluation: 10/29/2024  Provider: Justino Ogden MD   Note Started: 10:54 AM EDT 10/29/24    HISTORY OF PRESENT ILLNESS     Chief Complaint   Patient presents with    Neck Pain       History Provided By: Patient    HPI: Leona Flores is a 83 y.o. female with a history of arthritis, hypertension, presenting with right shoulder and arm pain.  Patient states that this has been going on for the past 2 weeks.  No known injury but no falls or trauma to the right shoulder.  Patient states that he is she has been having pain in the right lateral neck down into her shoulder over the top part of her right arm and into her right upper back.  States that she was prescribed ibuprofen 800 mg and it was helping initially but in the last couple of days has not been.  Called her primary care doctor's office yesterday and prescribed her Flexeril.  States that she took 3 doses but it has not been helping.  Has also been using Salonpas and heat.  Denies any chest pain, nausea or vomiting.    PAST MEDICAL HISTORY   Past Medical History:  Past Medical History:   Diagnosis Date    Anemia     Arthritis     Depression     Diabetes mellitus (HCC)     Hyperlipidemia     Hypertension     Migraine        Past Surgical History:  Past Surgical History:   Procedure Laterality Date    COLONOSCOPY      polyps    COLONOSCOPY N/A 7/7/2023    COLONOSCOPY performed by Yunier Mensah MD at [facility] ENDOSCOPY       Family History:  History reviewed. No pertinent family history.    Social History:  Social History     Tobacco Use    Smoking status: Never   Substance Use Topics    Alcohol use: Never    Drug use: Never       Allergies:  No Known Allergies    PCP: Amando Conner MD    Current Meds:   No current facility-administered medications for this encounter.     Current Outpatient

## (undated) DEVICE — PROCEDURE KIT BX00717111

## (undated) DEVICE — MASK ANES INF SZ 2 PREM TAIL VLV INFL PRT UNSCENTED SGL PT

## (undated) DEVICE — CANNULA NSL O2 AD 7 FT END-TIDAL CARBON DIOX VENTFLO

## (undated) DEVICE — FORCEPS BX 240CM 2.4MM L NDL RAD JAW 4 M00513334